# Patient Record
Sex: MALE
[De-identification: names, ages, dates, MRNs, and addresses within clinical notes are randomized per-mention and may not be internally consistent; named-entity substitution may affect disease eponyms.]

---

## 2023-07-14 ENCOUNTER — APPOINTMENT (OUTPATIENT)
Dept: ORTHOPEDIC SURGERY | Facility: CLINIC | Age: 23
End: 2023-07-14
Payer: MEDICAID

## 2023-07-14 VITALS — BODY MASS INDEX: 21.19 KG/M2 | WEIGHT: 135 LBS | HEIGHT: 67 IN

## 2023-07-14 DIAGNOSIS — M25.312 OTHER INSTABILITY, LEFT SHOULDER: ICD-10-CM

## 2023-07-14 DIAGNOSIS — Z78.9 OTHER SPECIFIED HEALTH STATUS: ICD-10-CM

## 2023-07-14 DIAGNOSIS — Z00.00 ENCOUNTER FOR GENERAL ADULT MEDICAL EXAMINATION W/OUT ABNORMAL FINDINGS: ICD-10-CM

## 2023-07-14 PROCEDURE — 73010 X-RAY EXAM OF SHOULDER BLADE: CPT | Mod: LT

## 2023-07-14 PROCEDURE — 73030 X-RAY EXAM OF SHOULDER: CPT | Mod: LT

## 2023-07-14 PROCEDURE — 99204 OFFICE O/P NEW MOD 45 MIN: CPT

## 2023-07-14 NOTE — HISTORY OF PRESENT ILLNESS
[de-identified] : 07/14/23: CARY is a 23 year old [right] hand dominant M who presents today complaining of Left Ivana=oulder Pain\par Date of Injury/Onset: 06/23/23\par Pain:    At Rest: 2/10 \par With Activity: 5/10 \par Mechanism of injury: Fighting felt his shoulder dislocate\par Quality of symptoms: Constant pain, popping and sharp\par Improves with: Tylenol\par Worse with: Sleeping\par Prior treatment: ER: Popped shoulder back in\par Out of work/sport: [Yes], since [date of injury]\par School/Sport/Position/Occupation: [employer] \par Additional Information: [None]\par \par

## 2023-07-14 NOTE — DISCUSSION/SUMMARY
[de-identified] : Assessment: The patient is a 23 year old male with Left Shoulder Pain and physical exam findings consistent with possible Labrum Tear\par \par Patient and I discussed their symptoms. Discussed findings of today's exam and possible causes of patient's pain. Educated patient on their most probable diagnosis. Reviewed possible courses of treatment, and we collaboratively decided best course of treatment at this time will include:\par \par 1. MRI Left Shoulder R/O Labrum Tear\par \par The patient's current medication management of their orthopedic diagnosis was reviewed today: \par \par (1) We discussed a comprehensive treatment plan that included possible pharmaceutical management involving the use of prescription strength medications including but not limited to options such as oral Naprosyn 500mg BID, once daily Meloxicam 15 mg, or 500-650 mg Tylenol versus over the counter oral medications and topical prescription NSAID Pennsaid vs over the counter Voltaren gel. \par \par (2) There is a moderate risk of morbidity with further treatment, especially from use of prescription strength medications and possible side effects of these medications which include upset stomach with oral medications, skin reactions to topical medications and cardiac/renal issues with long term use. \par \par (3) I recommended that the patient follow-up with their medical physician to discuss any significant specific potential issues with long term medication use such as interactions with current medications or with exacerbation of underlying medical comorbidities. \par \par (4) The benefits and risks associated with use of injectable, oral or topical, prescription and over the counter anti-inflammatory medications were discussed with the patient. The patient voiced understanding of the risks including but not limited to bleeding, stroke, kidney dysfunction, heart disease, and were referred to the black box warning label for further information.\par \par Prior to appointment and during encounter with patient extensive medical records were reviewed including but not limited to, hospital records, out patient records, imaging results, and lab data. During this appointment the patient was examined, diagnoses were discussed and explained in a face to face manner. In addition extensive time was spent reviewing aforementioned diagnostic studies. Counseling including abnormal image results, differential diagnoses, treatment options, risk and benefits, lifestyle changes, current condition, and current medications was performed. Patient's comments, questions, and concerns were address and patient verbalized understanding.\par \par Follow up after MRI \par

## 2023-07-14 NOTE — IMAGING
[Left] : left shoulder [There are no fractures, subluxations or dislocations. No significant abnormalities are seen] : There are no fractures, subluxations or dislocations. No significant abnormalities are seen [Type 2 acromion] : Type 2 acromion [de-identified] : The patient is a well appearing 23 year year old male  of their stated age.\par Neck is supple & nontender to palpation. Negative Spurling's test.\par \par Effected Shoulder \par Inspection:\par Scapula Winging: Mild\par Deformity: None\par Erythema: None\par Ecchymosis: None\par Abrasions: None\par Effusion: None\par \par Range of Motion:\par Active Forward Flexion: 170 degrees \par Passive Forward Flexion: 180 degrees \par Active IR : T12 \par Active ER : 45 degrees\par \par Motor Exam:\par Forward Flexion: 5 out of 5\par Flexion Plane of Scapula: 5 out of 5\par Abduction: 5 out of 5\par Internal Rotation: 5 out of 5\par External Rotation: 5 out of 5\par Distal Motor Strength: 5 out of 5\par \par Stability Testing:\par Anterior: 1+\par Posterior: 1+\par Sulcus N: 1+\par Sulcus ER: 1+\par \par Provocative Tests:\par Drop Arm: Negative\par Impingement: Negative\par [place]: Positive\par X-Arm Adduction: Negative\par Belly Press: Negative\par Bear Hug: Negative\par Lift Off: Negative\par Apprehension: Positive\par Relocation: Negative\par Posterior Load & Shift: Positive, 2+\par \par Palpation:\par AC Joint: Nontender\par Clavicle: Nontender\par Anterior Shoulder: TTP\par SC Joint: Nontender\par Bicipital Groove: TTP\par Coracoid Process: Nontender\par Pectoralis Minor Tendon: Nontender\par Pectoralis Major Tendon: Nontender & palpably intact\par Latissimus Dorsi: Nontender \par Proximal Humerus: Nontender\par Scapula Body: Nontender\par Medial Scapula Boarder: Nontender\par Scapula Spine: Nontender\par \par Neurologic Exam: Sensation to Light Touch:\par Axillary: Grossly intact\par Ulnar: Grossly intact\par Radial: Grossly intact\par Median: Grossly intact\par Other:  N/A\par \par Circulatory/Pulses:\par Ulnar: 2+\par Radial: 2+\par Other Pertinent Findings: None\par \par Contralateral Shoulder\par Range of Motion:\par Active Forward Flexion: 180 degrees \par Active Abduction: 180 degrees \par Passive Forward Flexion: 180 degrees \par Passive Abduction: 180 degrees \par ER @ 90 degrees: 90 degrees\par IR @ 90 degrees: 45 degrees\par ER @ 0 degrees: 50 degrees\par \par Motor Exam:\par Forward Flexion: 5 out of 5\par Flexion Plane of Scapula: 5 out of 5\par Abduction: 5 out of 5\par Internal Rotation: 5 out of 5\par External Rotation: 5 out of 5\par Distal Motor Strength: 5 out of 5\par \par Stability Testing:\par Anterior: 1+\par Posterior: 1+\par Sulcus N: 1+\par Sulcus ER: 1+\par \par Other Pertinent Findings: None\par \par